# Patient Record
(demographics unavailable — no encounter records)

---

## 2025-07-01 NOTE — HISTORY OF PRESENT ILLNESS
[FreeTextEntry8] : 57 y/o F presents for update for  health issues. HAs been on [place] for months taking care of sister in law. Has hx of HLD refused Meds. Requests Mammo.  Admits to neck (posterior) pain radiates up and down.

## 2025-07-01 NOTE — PHYSICAL EXAM
[Normal] : affect was normal and insight and judgment were intact [de-identified] : TTP Trapezius

## 2025-07-23 NOTE — HISTORY OF PRESENT ILLNESS
[FreeTextEntry8] : 59 yo, pmhx of HLD and LCIS of the left breast, c/o cough c/o cough and congestion sx have been ongoing since 1 week symtoms include: productive cough, yellow sputum  denies any chest pain or sob, fever or chills Sick contacts- son recent travel- none  self treatment- mucinex  tested prior- none vaccine status- utd  used to smoke- 1 cig a day 25 years, quit 5- 10 years ago; now again 1 cig socially  denies any other associated symptoms denies any other complaints or concerns at this time